# Patient Record
Sex: MALE | Race: OTHER | URBAN - METROPOLITAN AREA
[De-identification: names, ages, dates, MRNs, and addresses within clinical notes are randomized per-mention and may not be internally consistent; named-entity substitution may affect disease eponyms.]

---

## 2024-10-09 ENCOUNTER — OFFICE VISIT (OUTPATIENT)
Dept: UROLOGY | Facility: CLINIC | Age: 39
End: 2024-10-09

## 2024-10-09 VITALS
WEIGHT: 219.8 LBS | DIASTOLIC BLOOD PRESSURE: 78 MMHG | TEMPERATURE: 98.2 F | HEART RATE: 93 BPM | SYSTOLIC BLOOD PRESSURE: 118 MMHG

## 2024-10-09 DIAGNOSIS — N52.8 OTHER MALE ERECTILE DYSFUNCTION: Primary | ICD-10-CM

## 2024-10-09 DIAGNOSIS — R35.89 POLYURIA: ICD-10-CM

## 2024-10-09 LAB
POC APPEARANCE, URINE: CLEAR
POC BILIRUBIN, URINE: NEGATIVE
POC BLOOD, URINE: NEGATIVE
POC COLOR, URINE: YELLOW
POC GLUCOSE, URINE: NEGATIVE MG/DL
POC KETONES, URINE: NEGATIVE MG/DL
POC LEUKOCYTES, URINE: NEGATIVE
POC NITRITE,URINE: NEGATIVE
POC PH, URINE: 7 PH
POC PROTEIN, URINE: NEGATIVE MG/DL
POC SPECIFIC GRAVITY, URINE: 1.02
POC UROBILINOGEN, URINE: 0.2 EU/DL

## 2024-10-09 PROCEDURE — 81003 URINALYSIS AUTO W/O SCOPE: CPT | Performed by: STUDENT IN AN ORGANIZED HEALTH CARE EDUCATION/TRAINING PROGRAM

## 2024-10-09 PROCEDURE — 99204 OFFICE O/P NEW MOD 45 MIN: CPT | Performed by: STUDENT IN AN ORGANIZED HEALTH CARE EDUCATION/TRAINING PROGRAM

## 2024-10-09 NOTE — PROGRESS NOTES
Scribed for Dr. Sai Orellana by Harjeet Gipson. I, Dr. Sai Orellana have personally reviewed and agreed with the information entered by the Virtual Scribe. 10/09/24.    ASSESSMENT:  Problem List Items Addressed This Visit    None  Visit Diagnoses       Other male erectile dysfunction    -  Primary    Relevant Orders    Testosterone    Polyuria        Relevant Orders    POCT UA Automated manually resulted (Completed)    Post-Void Residual (Completed)           PLAN:  #OAB symptoms  Advised to reduce his fluid and caffeine intake.   Advised on common bladder irritants including caffeine.   Next visit, if no improvement can consider medical therapies.   RTC in 2 months for reassessment.     Discussed AUA guidelines for overactive bladder symptoms (OAB).  Reviewed behavioral medications, medications, and third-line procedures.   Patient agrees to proceed with behavioral modifications, timed voids, and monitoring bladder irritants.  We discussed behavioral modifications that can contribute to lessening irritative symptoms, namely evening fluid restriction, elevating one's legs for a while before bedtime, voiding right before bedtime, and avoiding bladder irritants like caffeine, carbonated beverages, dark-colored beverages, artificial sweeteners, acidic foods and beverages, and spicy foods.  In addition, advised the following:  Avoid caffeine  Avoid/restrict fluids altogether after 6 pm.    #Erectile dysfunction  Reports dependency on OTC Viagra.   States he has to take in order to maintain.   Opts evaluate his testosterone.  If abnormal, may refer to Dr. Solitario next visit.    #Diabetic screening  Expressed concern of having diabetes despite glucose tests being within normal limits in Aug 2024. Provided reassurance, advised to follow up with PCP for management.    All questions were answered to the patient’s satisfaction.  Patient agrees with the plan and wishes to proceed.  Continue follow-up for ongoing care of his  chronic medical conditions.       History of Present Illness (HPI):  Richie presents as a new patient for an evaluation.  The patient’s EMR has been reviewed.  Lives in [facility],   Currently in [place] on a temporary work assignment.     TODAY: (10/09/24)  From a urinary standpoint:  Presents complaining of urinary frequency; DTF q1-2hrs ; NTF 1-2x.   States became bothersome about 4 weeks ago.   Drinks at least 6 cups of water per day;   Drinks 1-3 cups of coffee/day.     Expressed urinary symptoms may be related to diabetes.     States he was tested for diabetes in Aug 2024 which was negative.   Denies any hx of UTI, gross hematuria or stones.   IO urinalysis negative for blood or infection.    From a sexual health standpoint:   Reports issues with erectile dysfunction.   States he has to take OTC Viagra and smoke marijuana in order to maintain his erection.  States he is dependent on the OTC Viagra.    PMH: Denies.  PSH: throat cyst surgery.   FH: No FH of  malignancy.   SH: Marijuana use, no tobacco use.     No past medical history on file.  No past surgical history on file.  No family history on file.  Social History     Tobacco Use   Smoking Status Not on file   Smokeless Tobacco Not on file     No current outpatient medications on file.     No current facility-administered medications for this visit.     No Known Allergies  Past medical, surgical, family and social history in the chart was reviewed and is accurate including any additions to what is in this HPI.    REVIEW OF SYSTEMS (ROS):   Constitutional: denies any unintentional weight loss or change in strength.  Integumentary: denies any rashes or pruritus.  Eyes: denies any double vision or eye pain.  Ear/Nose/Mouth/Throat: denies any nosebleeds or gum bleeds.  Cardiovascular: denies any chest pain or syncope.  Respiratory: denies hemoptysis.  Gastrointestinal: denies nausea or vomiting.  Musculoskeletal: denies muscle cramping or weakness.  Neurologic:  "denies convulsions or seizures.  Hematologic/Lymphatic: denies bleeding tendencies.  Endocrine: denies heat/cold intolerance.  All other systems have been reviewed and are negative unless otherwise noted in the HPI.     OBJECTIVE:  Visit Vitals  /78   Pulse 93   Temp 36.8 °C (98.2 °F) (Temporal)     PHYSICAL EXAM:  Constitutional: No obvious distress.  Eyes: Non-injected conjunctiva, sclera clear, EOMI.  Ears/Nose/Mouth/Throat: No obvious drainage per ears or nose.  Cardiovascular: Extremities are warm and well perfused. No edema, cyanosis or pallor.  Respiratory: No audible wheezing/stridor; respirations do not appear labored.  Gastrointestinal: Abdomen soft, not distended.  Musculoskeletal: Normal ROM of extremities.  Skin: No obvious rashes or open sores.  Neurologic: Alert and oriented, CN 2-12 grossly intact.  Psychiatric: Answers questions appropriately with normal affect.  Hematologic/Lymphatic/Immunologic: No obvious bruises or sites of spontaneous bleeding.  Genitourinary: No CVA tenderness, bladder not palpable.     LABS & IMAGING:  No results found for: \"WBC\", \"HGB\", \"HCT\", \"PLT\", \"CHOL\", \"TRIG\", \"HDL\", \"LDLDIRECT\", \"ALT\", \"AST\", \"NA\", \"K\", \"CL\", \"CREATININE\", \"BUN\", \"CO2\", \"TSH\", \"PSA\", \"INR\", \"GLUF\", \"HGBA1C\", \"ALBUR\"  Scribed for Dr. Sai Orellana by Harjeet Gipson.  I, Dr. Sai Orellana have personally reviewed and agreed with the information entered by the Virtual Scribe. 10/09/24.  "

## 2024-12-11 ENCOUNTER — APPOINTMENT (OUTPATIENT)
Dept: UROLOGY | Facility: CLINIC | Age: 39
End: 2024-12-11